# Patient Record
Sex: FEMALE | Race: BLACK OR AFRICAN AMERICAN | Employment: UNEMPLOYED | URBAN - METROPOLITAN AREA
[De-identification: names, ages, dates, MRNs, and addresses within clinical notes are randomized per-mention and may not be internally consistent; named-entity substitution may affect disease eponyms.]

---

## 2019-06-03 ENCOUNTER — HOSPITAL ENCOUNTER (EMERGENCY)
Age: 44
Discharge: HOME | End: 2019-06-04
Payer: COMMERCIAL

## 2019-06-03 ENCOUNTER — APPOINTMENT (OUTPATIENT)
Dept: GENERAL RADIOLOGY | Age: 44
End: 2019-06-03
Payer: COMMERCIAL

## 2019-06-03 VITALS
OXYGEN SATURATION: 98 % | DIASTOLIC BLOOD PRESSURE: 93 MMHG | HEART RATE: 115 BPM | SYSTOLIC BLOOD PRESSURE: 104 MMHG | TEMPERATURE: 98.6 F | RESPIRATION RATE: 30 BRPM

## 2019-06-03 DIAGNOSIS — J45.901 EXACERBATION OF ASTHMA, UNSPECIFIED ASTHMA SEVERITY, UNSPECIFIED WHETHER PERSISTENT: Primary | ICD-10-CM

## 2019-06-03 LAB
ALBUMIN SERPL-MCNC: 4.7 GM/DL (ref 3.4–5)
ALP BLD-CCNC: 74 IU/L (ref 40–129)
ALT SERPL-CCNC: 53 U/L (ref 10–40)
ANION GAP SERPL CALCULATED.3IONS-SCNC: 11 MMOL/L (ref 4–16)
AST SERPL-CCNC: 41 IU/L (ref 15–37)
BASOPHILS ABSOLUTE: 0 K/CU MM
BASOPHILS RELATIVE PERCENT: 0.5 % (ref 0–1)
BILIRUB SERPL-MCNC: 0.5 MG/DL (ref 0–1)
BUN BLDV-MCNC: 10 MG/DL (ref 6–23)
CALCIUM SERPL-MCNC: 9.9 MG/DL (ref 8.3–10.6)
CHLORIDE BLD-SCNC: 99 MMOL/L (ref 99–110)
CO2: 27 MMOL/L (ref 21–32)
CREAT SERPL-MCNC: 0.8 MG/DL (ref 0.6–1.1)
DIFFERENTIAL TYPE: ABNORMAL
EOSINOPHILS ABSOLUTE: 0.1 K/CU MM
EOSINOPHILS RELATIVE PERCENT: 1 % (ref 0–3)
GFR AFRICAN AMERICAN: >60 ML/MIN/1.73M2
GFR NON-AFRICAN AMERICAN: >60 ML/MIN/1.73M2
GLUCOSE BLD-MCNC: 158 MG/DL (ref 70–99)
HCT VFR BLD CALC: 37.6 % (ref 37–47)
HEMOGLOBIN: 11.4 GM/DL (ref 12.5–16)
IMMATURE NEUTROPHIL %: 0.5 % (ref 0–0.43)
LYMPHOCYTES ABSOLUTE: 3.7 K/CU MM
LYMPHOCYTES RELATIVE PERCENT: 41.7 % (ref 24–44)
MCH RBC QN AUTO: 25.7 PG (ref 27–31)
MCHC RBC AUTO-ENTMCNC: 30.3 % (ref 32–36)
MCV RBC AUTO: 84.7 FL (ref 78–100)
MONOCYTES ABSOLUTE: 0.8 K/CU MM
MONOCYTES RELATIVE PERCENT: 9.1 % (ref 0–4)
NUCLEATED RBC %: 0 %
PDW BLD-RTO: 14.4 % (ref 11.7–14.9)
PLATELET # BLD: 425 K/CU MM (ref 140–440)
PMV BLD AUTO: 10.4 FL (ref 7.5–11.1)
POTASSIUM SERPL-SCNC: 4 MMOL/L (ref 3.5–5.1)
RBC # BLD: 4.44 M/CU MM (ref 4.2–5.4)
SEGMENTED NEUTROPHILS ABSOLUTE COUNT: 4.2 K/CU MM
SEGMENTED NEUTROPHILS RELATIVE PERCENT: 47.2 % (ref 36–66)
SODIUM BLD-SCNC: 137 MMOL/L (ref 135–145)
TOTAL IMMATURE NEUTOROPHIL: 0.04 K/CU MM
TOTAL NUCLEATED RBC: 0 K/CU MM
TOTAL PROTEIN: 8.7 GM/DL (ref 6.4–8.2)
TROPONIN T: <0.01 NG/ML
WBC # BLD: 8.8 K/CU MM (ref 4–10.5)

## 2019-06-03 PROCEDURE — 6370000000 HC RX 637 (ALT 250 FOR IP): Performed by: PHYSICIAN ASSISTANT

## 2019-06-03 PROCEDURE — 6370000000 HC RX 637 (ALT 250 FOR IP): Performed by: EMERGENCY MEDICINE

## 2019-06-03 PROCEDURE — 85025 COMPLETE CBC W/AUTO DIFF WBC: CPT

## 2019-06-03 PROCEDURE — 94640 AIRWAY INHALATION TREATMENT: CPT

## 2019-06-03 PROCEDURE — 80053 COMPREHEN METABOLIC PANEL: CPT

## 2019-06-03 PROCEDURE — 71046 X-RAY EXAM CHEST 2 VIEWS: CPT

## 2019-06-03 PROCEDURE — 84484 ASSAY OF TROPONIN QUANT: CPT

## 2019-06-03 PROCEDURE — 93005 ELECTROCARDIOGRAM TRACING: CPT | Performed by: PHYSICIAN ASSISTANT

## 2019-06-03 PROCEDURE — 6360000002 HC RX W HCPCS: Performed by: PHYSICIAN ASSISTANT

## 2019-06-03 PROCEDURE — 99285 EMERGENCY DEPT VISIT HI MDM: CPT

## 2019-06-03 PROCEDURE — 96374 THER/PROPH/DIAG INJ IV PUSH: CPT

## 2019-06-03 RX ORDER — IPRATROPIUM BROMIDE AND ALBUTEROL SULFATE 2.5; .5 MG/3ML; MG/3ML
1 SOLUTION RESPIRATORY (INHALATION)
Status: DISCONTINUED | OUTPATIENT
Start: 2019-06-04 | End: 2019-06-04 | Stop reason: HOSPADM

## 2019-06-03 RX ORDER — METHYLPREDNISOLONE SODIUM SUCCINATE 125 MG/2ML
125 INJECTION, POWDER, LYOPHILIZED, FOR SOLUTION INTRAMUSCULAR; INTRAVENOUS ONCE
Status: COMPLETED | OUTPATIENT
Start: 2019-06-03 | End: 2019-06-03

## 2019-06-03 RX ORDER — IPRATROPIUM BROMIDE AND ALBUTEROL SULFATE 2.5; .5 MG/3ML; MG/3ML
1 SOLUTION RESPIRATORY (INHALATION) ONCE
Status: COMPLETED | OUTPATIENT
Start: 2019-06-03 | End: 2019-06-04

## 2019-06-03 RX ORDER — IPRATROPIUM BROMIDE AND ALBUTEROL SULFATE 2.5; .5 MG/3ML; MG/3ML
1 SOLUTION RESPIRATORY (INHALATION) ONCE
Status: COMPLETED | OUTPATIENT
Start: 2019-06-04 | End: 2019-06-03

## 2019-06-03 RX ORDER — IPRATROPIUM BROMIDE AND ALBUTEROL SULFATE 2.5; .5 MG/3ML; MG/3ML
SOLUTION RESPIRATORY (INHALATION)
Status: DISCONTINUED
Start: 2019-06-03 | End: 2019-06-04 | Stop reason: HOSPADM

## 2019-06-03 RX ADMIN — IPRATROPIUM BROMIDE AND ALBUTEROL SULFATE 1 AMPULE: .5; 3 SOLUTION RESPIRATORY (INHALATION) at 22:25

## 2019-06-03 RX ADMIN — METHYLPREDNISOLONE SODIUM SUCCINATE 125 MG: 125 INJECTION, POWDER, LYOPHILIZED, FOR SOLUTION INTRAMUSCULAR; INTRAVENOUS at 23:53

## 2019-06-03 RX ADMIN — IPRATROPIUM BROMIDE AND ALBUTEROL SULFATE 1 AMPULE: .5; 3 SOLUTION RESPIRATORY (INHALATION) at 22:35

## 2019-06-03 ASSESSMENT — PAIN DESCRIPTION - LOCATION: LOCATION: CHEST

## 2019-06-03 ASSESSMENT — PAIN DESCRIPTION - PAIN TYPE: TYPE: ACUTE PAIN

## 2019-06-03 ASSESSMENT — PAIN SCALES - WONG BAKER: WONGBAKER_NUMERICALRESPONSE: 8

## 2019-06-03 NOTE — LETTER
[facility] Emergency Department  [phone] [id number]  Phone: [phone]  Fax: [phone]               June 4, 2019    Patient: Dajuan Simental   YOB: 1975   Date of Visit: 6/3/2019       To Whom It May Concern:    Dajuan Simental was seen and treated in our emergency department on 6/3/2019.  She may return to work on 06/05/2019    Sincerely,     Nurse        Signature:__________________________________

## 2019-06-04 PROCEDURE — 6370000000 HC RX 637 (ALT 250 FOR IP): Performed by: PHYSICIAN ASSISTANT

## 2019-06-04 RX ORDER — GUAIFENESIN AND CODEINE PHOSPHATE 100; 10 MG/5ML; MG/5ML
5 SOLUTION ORAL 3 TIMES DAILY PRN
Qty: 120 ML | Refills: 0 | Status: SHIPPED | OUTPATIENT
Start: 2019-06-04 | End: 2019-06-07

## 2019-06-04 RX ORDER — SOFT LENS DISINFECTANT
1 SOLUTION, NON-ORAL MISCELLANEOUS DAILY PRN
Qty: 1 DEVICE | Refills: 0 | Status: SHIPPED | OUTPATIENT
Start: 2019-06-04

## 2019-06-04 RX ORDER — ALBUTEROL SULFATE 2.5 MG/3ML
2.5 SOLUTION RESPIRATORY (INHALATION) EVERY 6 HOURS PRN
Qty: 30 EACH | Refills: 0 | Status: SHIPPED | OUTPATIENT
Start: 2019-06-04

## 2019-06-04 RX ORDER — PREDNISONE 10 MG/1
60 TABLET ORAL DAILY
Qty: 30 TABLET | Refills: 0 | Status: SHIPPED | OUTPATIENT
Start: 2019-06-04 | End: 2019-06-09

## 2019-06-04 RX ORDER — ALBUTEROL SULFATE 90 UG/1
2 AEROSOL, METERED RESPIRATORY (INHALATION) EVERY 6 HOURS PRN
Qty: 1 INHALER | Refills: 0 | Status: SHIPPED | OUTPATIENT
Start: 2019-06-04

## 2019-06-04 RX ADMIN — IPRATROPIUM BROMIDE AND ALBUTEROL SULFATE 1 AMPULE: .5; 3 SOLUTION RESPIRATORY (INHALATION) at 00:10

## 2019-06-04 NOTE — ED PROVIDER NOTES
eMERGENCY dEPARTMENT eNCOUnter        [address] [facility]    Chief Complaint   Patient presents with    Shortness of Breath       HPI    Dion Sabillon is a 40 y.o. female who presents with cough and shortness of breath. Symptoms began 2 weeks ago. Productive of clear sputum. SOB worse with exertion and coughing spells. Denies any fever, chills. + chest tightness with coughing only. + wheezing. She has a history of asthma but is out of inhalers and nebulizers. Non-smoker. REVIEW OF SYSTEMS    Constitutional:  Denies fever. HENT:  Denies headache, nasal congestion, sore throat. Neck:  Denies neck pain. Respiratory:  + shortness of breath, cough. Cardiovascular:  + chest pain. GI:  Denies abdominal pain, nausea, vomiting, diarrhea. :  Denies urinary symptoms. Musculoskeletal:  Denies extremity swelling. Integument:  Denies skin changes. Neurologic:  Denies any LOC. All other systems reviewed and are negative.     PAST MEDICAL & SURGICAL HISTORY    Past Medical History:   Diagnosis Date    Anemia     Cancer ([facility] [address]     cervical    Fibromyalgia      Past Surgical History:   Procedure Laterality Date    CERVIX SURGERY      CYST REMOVAL      ovarian       CURRENT MEDICATIONS    Current Outpatient Rx   Medication Sig Dispense Refill    cephALEXin (KEFLEX) 500 MG capsule Take 1 capsule by mouth 4 times daily 40 capsule 0    HYDROcodone-acetaminophen (NORCO) 5-325 MG per tablet Take 1 tablet by mouth every 6 hours as needed for Pain 12 tablet 0       ALLERGIES    No Known Allergies    SOCIAL & FAMILY HISTORY    Social History     Socioeconomic History    Marital status:      Spouse name: Not on file    Number of children: Not on file    Years of education: Not on file    Highest education level: Not on file   Occupational History    Not on file   Social Needs    Financial resource strain: Not on file    Food insecurity:     Worry: Not on file     Inability: Not on file   Ignacio Maynard Transportation needs:     Medical: Not on file     Non-medical: Not on file   Tobacco Use    Smoking status: Never Smoker   Substance and Sexual Activity    Alcohol use: No    Drug use: No    Sexual activity: Not on file   Lifestyle    Physical activity:     Days per week: Not on file     Minutes per session: Not on file    Stress: Not on file   Relationships    Social connections:     Talks on phone: Not on file     Gets together: Not on file     Attends [employer] service: Not on file     Active member of club or organization: Not on file     Attends meetings of clubs or organizations: Not on file     Relationship status: Not on file    Intimate partner violence:     Fear of current or ex partner: Not on file     Emotionally abused: Not on file     Physically abused: Not on file     Forced sexual activity: Not on file   Other Topics Concern    Not on file   Social History Narrative    Not on file     No family history on file. PHYSICAL EXAM    VITAL SIGNS: BP (!) 104/93   Pulse 115   Temp 98.6 °F (37 °C) (Oral)   Resp 30   SpO2 98%    Constitutional:  Well developed, well nourished, no acute distress   HENT:  NC/AT. Ears, nose, mouth normal.  Neck:  Supple. Respiratory:  Lungs with wheezing throughout, mildly labored breathing. Hard coughing. Cardiovascular:  Tachycardic. Regular. GI:  Soft, non-tender, bowel sounds active. Musculoskeletal:  No edema, no tenderness. DP intact and symmetric. Integument:  Warm and dry. Neurologic:  Alert & oriented. Psych:  Pleasant affect.     RADIOLOGY/PROCEDURES    Labs Reviewed   CBC WITH AUTO DIFFERENTIAL - Abnormal; Notable for the following components:       Result Value    Hemoglobin 11.4 (*)     MCH 25.7 (*)     MCHC 30.3 (*)     Monocytes % 9.1 (*)     Immature Neutrophil % 0.5 (*)     All other components within normal limits   COMPREHENSIVE METABOLIC PANEL W/ REFLEX TO MG FOR LOW K - Abnormal; Notable for the following components: Glucose 158 (*)     Total Protein 8.7 (*)     ALT 53 (*)     AST 41 (*)     All other components within normal limits   TROPONIN     Xr Chest Standard (2 Vw)    Result Date: 6/3/2019  EXAMINATION: TWO XRAY VIEWS OF THE CHEST 6/3/2019 10:42 pm COMPARISON: None. HISTORY: ORDERING SYSTEM PROVIDED HISTORY: sob TECHNOLOGIST PROVIDED HISTORY: Reason for exam:->sob Ordering Physician Provided Reason for Exam: SOB Acuity: Acute Type of Exam: Initial FINDINGS: The cardiomediastinal silhouette is normal in size and contour. The lungs are clear. No pleural effusion or pneumothorax is present. No acute cardiopulmonary process     ED COURSE & MEDICAL DECISION MAKING    Pertinent Labs & Imaging studies reviewed and interpreted. (See chart for details)  -  Patient seen and evaluated in the emergency department. -  Triage and nursing notes reviewed and incorporated. -  Old chart records reviewed and incorporated. -  Supervising physician was Dr. Beatriz Sinclair. Patient was seen independently. -  Differential diagnosis includes: ACS, COPD exacerbation, CHF, MI, PE, pneumonia, pneumothorax, and others  -  Work-up included:  See above  -  ED treatment included:  Solumedrol, Duonebs  -  Results were discussed with patient. CXR is clear, labs unremarkable. On re-examination, she reports feeling much better. Lung sounds more clear. Speaking in full sentences, respirations unlabored. She reports being out of inhalers and nebulizer solution--does not know where nebulizer machine is. Will prescribed new machine as well as albuterol solution, rescue inhaler, steroids, cough syrup. FU with Primary Care in 2-3 days, return here as needed. She is agreeable with plan of care and disposition.  -  Disposition:  Home    FINAL IMPRESSION    1.  Exacerbation of asthma, unspecified asthma severity, unspecified whether persistent               Tiara Torres PA-C  06/04/19 [id number]

## 2019-06-04 NOTE — ED PROVIDER NOTES
EKG shows NSR @ 99. Normal axis with good R wave progression. No ST elevation or depression. No ectopy. No prior EKG available for comparison.       Sari Covington DO  06/04/19 [id number]

## 2019-06-04 NOTE — ED TRIAGE NOTES
Patient to ED with complaints of increased shortness of breath. Hx of asthma. Wheezing, tachypnea during triage. ekg completed during triage. Oxygen saturation 98% on room air.

## 2019-06-05 PROCEDURE — 93010 ELECTROCARDIOGRAM REPORT: CPT | Performed by: INTERNAL MEDICINE

## 2019-06-06 LAB
EKG ATRIAL RATE: 99 BPM
EKG DIAGNOSIS: NORMAL
EKG P AXIS: 49 DEGREES
EKG P-R INTERVAL: 148 MS
EKG Q-T INTERVAL: 314 MS
EKG QRS DURATION: 72 MS
EKG QTC CALCULATION (BAZETT): 402 MS
EKG R AXIS: 42 DEGREES
EKG T AXIS: 7 DEGREES
EKG VENTRICULAR RATE: 99 BPM